# Patient Record
Sex: FEMALE | Race: ASIAN | NOT HISPANIC OR LATINO | Employment: OTHER | URBAN - METROPOLITAN AREA
[De-identification: names, ages, dates, MRNs, and addresses within clinical notes are randomized per-mention and may not be internally consistent; named-entity substitution may affect disease eponyms.]

---

## 2019-04-29 ENCOUNTER — OFFICE VISIT (OUTPATIENT)
Dept: VASCULAR SURGERY | Facility: CLINIC | Age: 64
End: 2019-04-29
Payer: MEDICARE

## 2019-04-29 VITALS
WEIGHT: 104.8 LBS | SYSTOLIC BLOOD PRESSURE: 130 MMHG | TEMPERATURE: 97.7 F | HEIGHT: 61 IN | HEART RATE: 67 BPM | BODY MASS INDEX: 19.79 KG/M2 | DIASTOLIC BLOOD PRESSURE: 90 MMHG

## 2019-04-29 DIAGNOSIS — I83.891 SYMPTOMATIC VARICOSE VEINS OF RIGHT LOWER EXTREMITY: Primary | ICD-10-CM

## 2019-04-29 PROBLEM — I83.899 SYMPTOMATIC VARICOSE VEINS: Status: ACTIVE | Noted: 2019-04-29

## 2019-04-29 PROCEDURE — 99202 OFFICE O/P NEW SF 15 MIN: CPT | Performed by: SURGERY

## 2019-04-29 RX ORDER — CHLORAL HYDRATE 500 MG
2 CAPSULE ORAL DAILY
COMMUNITY

## 2019-04-29 RX ORDER — ESTRADIOL 0.1 MG/G
CREAM VAGINAL
COMMUNITY
Start: 2011-03-07

## 2021-05-19 ENCOUNTER — APPOINTMENT (OUTPATIENT)
Dept: LAB | Facility: HOSPITAL | Age: 66
End: 2021-05-19
Attending: SPECIALIST
Payer: MEDICARE

## 2021-05-19 DIAGNOSIS — G50.1 FACIAL PAIN, ATYPICAL: ICD-10-CM

## 2021-05-19 LAB
BUN SERPL-MCNC: 14 MG/DL (ref 5–25)
CREAT SERPL-MCNC: 0.63 MG/DL (ref 0.6–1.3)
GFR SERPL CREATININE-BSD FRML MDRD: 93 ML/MIN/1.73SQ M

## 2021-05-19 PROCEDURE — 82565 ASSAY OF CREATININE: CPT

## 2021-05-19 PROCEDURE — 36415 COLL VENOUS BLD VENIPUNCTURE: CPT

## 2021-05-19 PROCEDURE — 84520 ASSAY OF UREA NITROGEN: CPT

## 2021-06-01 ENCOUNTER — HOSPITAL ENCOUNTER (OUTPATIENT)
Dept: RADIOLOGY | Facility: HOSPITAL | Age: 66
Discharge: HOME | End: 2021-06-01
Attending: SPECIALIST
Payer: MEDICARE

## 2021-06-01 DIAGNOSIS — G50.1 FACIAL PAIN, ATYPICAL: ICD-10-CM

## 2021-06-01 PROCEDURE — G1004 CDSM NDSC: HCPCS

## 2021-06-01 PROCEDURE — 70491 CT SOFT TISSUE NECK W/DYE: CPT

## 2021-06-01 RX ADMIN — IOHEXOL 70 ML: 350 INJECTION, SOLUTION INTRAVENOUS at 13:31

## 2021-09-25 ENCOUNTER — HOSPITAL ENCOUNTER (OUTPATIENT)
Dept: CT IMAGING | Facility: HOSPITAL | Age: 66
Discharge: HOME | End: 2021-09-25
Attending: SPECIALIST
Payer: MEDICARE

## 2021-09-25 DIAGNOSIS — J32.4 CHRONIC PANSINUSITIS: ICD-10-CM

## 2021-09-25 PROCEDURE — G1004 CDSM NDSC: HCPCS

## 2021-09-25 PROCEDURE — 70486 CT MAXILLOFACIAL W/O DYE: CPT

## 2022-01-10 ENCOUNTER — EVALUATION (OUTPATIENT)
Dept: PHYSICAL THERAPY | Facility: REHABILITATION | Age: 67
End: 2022-01-10
Payer: MEDICARE

## 2022-01-10 DIAGNOSIS — M79.18 MYOFASCIAL PAIN: ICD-10-CM

## 2022-01-10 DIAGNOSIS — M54.50 CHRONIC BILATERAL LOW BACK PAIN WITHOUT SCIATICA: Primary | ICD-10-CM

## 2022-01-10 DIAGNOSIS — G50.1 ATYPICAL FACIAL PAIN: ICD-10-CM

## 2022-01-10 DIAGNOSIS — M54.2 NECK PAIN: ICD-10-CM

## 2022-01-10 DIAGNOSIS — G89.29 CHRONIC BILATERAL LOW BACK PAIN WITHOUT SCIATICA: Primary | ICD-10-CM

## 2022-01-10 PROCEDURE — 97112 NEUROMUSCULAR REEDUCATION: CPT | Performed by: PHYSICAL THERAPIST

## 2022-01-10 PROCEDURE — 97162 PT EVAL MOD COMPLEX 30 MIN: CPT | Performed by: PHYSICAL THERAPIST

## 2022-01-10 NOTE — PROGRESS NOTES
PT Evaluation     Today's date: 1/10/2022  Patient name: Westley Ruiz  : 1955  MRN: [id number]  Referring provider: Radha Kong MD  Dx:   Encounter Diagnosis     ICD-10-CM    1  Chronic bilateral low back pain without sciatica  M54 50     G89 29    2  Neck pain  M54 2    3  Myofascial pain  M79 18    4  Atypical facial pain  G50 1        Start Time: 1205  Stop Time: 1255  Total time in clinic (min): 50 minutes    Assessment  Assessment details: Patient is a 74 y o  female that presents with lumbar spine and cervical/facial pain  Patient presents with decreased strength, decreased ROM, and postural abnormalitities  Patient has difficulty with looking upward, lifting, 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, and with ambulation secondary to impairments  Patient would benefit from skilled physical therapy services to address impairments to maximize function  Impairments: abnormal or restricted ROM, activity intolerance, impaired balance, impaired physical strength, lacks appropriate home exercise program and pain with function  Understanding of Dx/Px/POC: good   Prognosis: fair  Prognosis details: Chronic pain    Goals  Impairment:  1  Patient will reports 50% reduction in pain in 4 weeks to maximize function  2   Patient will improve strength to 4/5 in all planes to maximize function  3   Patient will improve ROM to [facility] in 4 weeks to maximize function  Functional:  1  Patient will improve FOTO by 4 points to 67/100 in 4 weeks to maximize function for lumbar spine  2  Patient will be independent with HEP in 4 weeks to maximize function  3  Patient will report no difficulty with ambulation in 4 weeks to maximize function  4  Patient will report no difficulty with looking upward in 4 weeks to maximize function  5  Patient will report no difficulty with lifting in 4 weeks to maximize function    6  Patient will improve FOTO by 3 points to 63/100 in 4 weeks to maximize function for cervical spine       Plan  Plan details: Patient will be a RE in 4 weeks  Patient would benefit from: skilled physical therapy  Planned modality interventions: cryotherapy, thermotherapy: hydrocollator packs, unattended electrical stimulation and TENS  Planned therapy interventions: abdominal trunk stabilization, patient education, neuromuscular re-education, home exercise program, therapeutic exercise, therapeutic activities, stretching, strengthening, postural training, manual therapy, functional ROM exercises and balance  Frequency: 2x week  Duration in visits: 9  Duration in weeks: 4  Treatment plan discussed with: patient        Subjective Evaluation    History of Present Illness  Mechanism of injury: Patient presents with low back and cervical/facial pain  Patient reports her low back pain has been present for several years, but has been worse over the last year  Her pain is worst in the morning waking up for the first 30-60 minutes  She feels she is flexed forward and would have pain trying to stand up straight  Once she is able to stand up straight her pain is minimal the remainder of the day  She feels she is tilted to the left in the morning  Historically, she feels this is worst when she is inactive and that weak muscles contribute  Patient reports her neck pain has been present for 20-30 years, but she has been having facial pain for the last 2 years  Patient has been taking Nortriptyline for her facial pain with relief  She reports her facial pain can be random  She feels it is nerve related  Patient has no pain with chewing  She will get pain when her head is being pushed forward as if by a couch  Patient reports her cervical spine pain is constant, but is worst with walking into her neck and shoulders as if someone is pulling  She will move her arms overhead and that seems to help  She feels something may be "stuck"    Patient has difficulty with looking upward, lifting, 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, and with ambulation  Pain  At best pain ratin (cervical spine: 1/10)  At worst pain ratin (cervical spine: 9/10 (pulling, tight))  Location: lumbar spine  Quality: tight and pulling  Aggravating factors: walking  Progression: no change    Hand dominance: right      Diagnostic Tests  No diagnostic tests performed  Treatments  Current treatment: physical therapy  Patient Goals  Patient goals for therapy: decreased pain          Objective     Static Posture     Head  Forward  Shoulders  Rounded  Palpation   Left   No palpable tenderness to the levator scapulae, middle trapezius, rhomboids and suboccipitals  Tenderness of the upper trapezius  Trigger point to upper trapezius  Right   No palpable tenderness to the levator scapulae, middle trapezius, rhomboids, suboccipitals and upper trapezius  Tenderness   Cervical Spine   No tenderness in the spinous process  Left Shoulder   No tenderness     Right Shoulder  No tenderness     Lumbar Spine  No tenderness in the spinous process  Left Hip   No tenderness in the PSIS  Right Hip   No tenderness in the PSIS       Neurological Testing     Reflexes   Left   Patellar (L4): brisk (3+)  Achilles (S1): normal (2+)    Right   Patellar (L4): brisk (3+)  Achilles (S1): normal (2+)    Active Range of Motion   Cervical/Thoracic Spine       Cervical    Flexion: 45 degrees   Extension: 65 degrees     with pain  Left lateral flexion: 45 degrees      Right lateral flexion: 35 degrees      Left rotation: 42 degrees  Right rotation: 42 degrees         Left Shoulder   Flexion: WFL  Abduction: WFL    Right Shoulder   Flexion: WFL  Abduction: WFL    Lumbar   Flexion:  WFL  Extension:  Restriction level: minimal  Left lateral flexion:  WFL  Right lateral flexion:  WFL  Left rotation:  Restriction level: minimal  Right rotation:  Restriction level: minimal    Joint Play   Joints within functional limits: C1, C2, C3, C4, C5, C6, C7, T1, T2, L1, L2, L3, L4 and L5     Hypomobile: T3, T4, T5, T6, T7 and T8     Strength/Myotome Testing     Left Shoulder     Planes of Motion   Flexion: 4   Abduction: 4     Right Shoulder     Planes of Motion   Flexion: 4   Abduction: 4     Left Elbow   Flexion: 4  Extension: 4    Right Elbow   Flexion: 4+  Extension: 4+    Left Wrist/Hand      (2nd hand position)     Trial 1: 39    Trial 2: 38    Right Wrist/Hand      (2nd hand position)     Trial 1: 40    Trial 2: 38    Left Hip   Planes of Motion   Flexion: 3+  Extension: 4  Abduction: 4-  External rotation: 4-  Internal rotation: 4    Right Hip   Planes of Motion   Flexion: 3+  Extension: 4  Abduction: 4-  External rotation: 4-  Internal rotation: 4    Left Knee   Flexion: 3+  Extension: 3+    Right Knee   Flexion: 4-  Extension: 3+    Left Ankle/Foot   Dorsiflexion: 4-  Inversion: 4  Eversion: 4    Right Ankle/Foot   Dorsiflexion: 4+  Inversion: 4+  Eversion: 4+    Tests     Lumbar     Left   Negative crossed SLR and passive SLR  Right   Negative crossed SLR and passive SLR  Left Pelvic Girdle/Sacrum   Negative: thigh thrust      Right Pelvic Girdle/Sacrum   Negative: thigh thrust      Left Hip   Negative SOLIS and FADIR  Right Hip   Negative FADIR  Additional Tests Details  (+) Shania sign    Ambulation     Observational Gait   Decreased walking speed and stride length       Functional Assessment        Single Leg Stance   Left: 30 seconds  Right: 26 seconds             Precautions: hx anxiety, osteoporosis      Manuals 1/10            IASTM L UT                                                    Neuro Re-Ed             Abdominal bracing 10" 10x issued            Scapular retraction 10" 10x issued            Cervical retraction 5" 10x issued            Row with band             Deep neck ffexion supine             Prone T, W             SLS             Ther Ex             UBE rev             SLR             sidelying hip abduction             Prone hip extension             bridge                                                    Ther Activity                                       Gait Training                                       Modalities             PRN                            Access Code [email or web address]

## 2022-01-14 ENCOUNTER — OFFICE VISIT (OUTPATIENT)
Dept: PHYSICAL THERAPY | Facility: REHABILITATION | Age: 67
End: 2022-01-14
Payer: MEDICARE

## 2022-01-14 DIAGNOSIS — G89.29 CHRONIC BILATERAL LOW BACK PAIN WITHOUT SCIATICA: Primary | ICD-10-CM

## 2022-01-14 DIAGNOSIS — M54.50 CHRONIC BILATERAL LOW BACK PAIN WITHOUT SCIATICA: Primary | ICD-10-CM

## 2022-01-14 DIAGNOSIS — M79.18 MYOFASCIAL PAIN: ICD-10-CM

## 2022-01-14 DIAGNOSIS — M54.2 NECK PAIN: ICD-10-CM

## 2022-01-14 DIAGNOSIS — G50.1 ATYPICAL FACIAL PAIN: ICD-10-CM

## 2022-01-14 PROCEDURE — 97110 THERAPEUTIC EXERCISES: CPT | Performed by: PHYSICAL THERAPIST

## 2022-01-14 PROCEDURE — 97112 NEUROMUSCULAR REEDUCATION: CPT | Performed by: PHYSICAL THERAPIST

## 2022-01-14 NOTE — PROGRESS NOTES
Daily Note     Today's date: 2022  Patient name: Luis Varela  : 1955  MRN: [id number]  Referring provider: Deepthi Duffy MD  Dx:   Encounter Diagnosis     ICD-10-CM    1  Chronic bilateral low back pain without sciatica  M54 50     G89 29    2  Neck pain  M54 2    3  Myofascial pain  M79 18    4  Atypical facial pain  G50 1        Start Time: 1115  Stop Time: 1200  Total time in clinic (min): 45 minutes    Subjective: Patient presents for first visit post IE  Patient reports that her lumbar spine pain may be slightly improved with HEP, but that her neck pain may be slightly more irritated  Objective: See treatment diary below      Assessment: Tolerated treatment well  Patient demonstrated fatigue post treatment, exhibited good technique with therapeutic exercises and would benefit from continued PT  Patient tolerates all treatment well  Educated she may have soreness from IASTM added this visit  Patient can progress as noted NV  Held on several UE progressions this visit due to soreness from HEP  Plan: Progress treatment as tolerated         Precautions: hx anxiety, osteoporosis      Manuals 1/10 1/14           IASTM L UT  8 min                                                  Neuro Re-Ed             Abdominal bracing 10" 10x issued 10" 10x           Scapular retraction 10" 10x issued 10" 10x           Cervical retraction 5" 10x issued 5" 10x           Row with band  NV           Deep neck ffexion supine  NV           Prone T, W  NV           SLS  3x15" naomi           Ther Ex             UBE for/rev  4 min           SLR  5" 10x naomi           sidelying hip abduction  5" 10x naomi           Prone hip extension  5" 10x naomi           bridge  5" 10x           L UT stretch  3x15"                                     Ther Activity                                       Gait Training                                       Modalities             PRN                          Access BIB [email or web address]

## 2022-01-18 ENCOUNTER — OFFICE VISIT (OUTPATIENT)
Dept: PHYSICAL THERAPY | Facility: REHABILITATION | Age: 67
End: 2022-01-18
Payer: MEDICARE

## 2022-01-18 DIAGNOSIS — M54.2 NECK PAIN: ICD-10-CM

## 2022-01-18 DIAGNOSIS — G89.29 CHRONIC BILATERAL LOW BACK PAIN WITHOUT SCIATICA: Primary | ICD-10-CM

## 2022-01-18 DIAGNOSIS — M54.50 CHRONIC BILATERAL LOW BACK PAIN WITHOUT SCIATICA: Primary | ICD-10-CM

## 2022-01-18 DIAGNOSIS — G50.1 ATYPICAL FACIAL PAIN: ICD-10-CM

## 2022-01-18 DIAGNOSIS — M79.18 MYOFASCIAL PAIN: ICD-10-CM

## 2022-01-18 PROCEDURE — 97110 THERAPEUTIC EXERCISES: CPT

## 2022-01-18 PROCEDURE — 97112 NEUROMUSCULAR REEDUCATION: CPT

## 2022-01-18 NOTE — PROGRESS NOTES
Daily Note     Today's date: 2022  Patient name: Simin Mena  : 1955  MRN: [id number]  Referring provider: Geoffrey Lopez MD  Dx:   Encounter Diagnosis     ICD-10-CM    1  Chronic bilateral low back pain without sciatica  M54 50     G89 29    2  Neck pain  M54 2    3  Myofascial pain  M79 18    4  Atypical facial pain  G50 1        Start Time: 1400  Stop Time: 1445  Total time in clinic (min): 45 minutes    Subjective: Patient reports "I always have some sort of pain" and notes cervical/lumbar symptoms are about the same with no change better or worse since last visit  Pt denies any c/o soreness after last treatment  Pt reports compliance with HEP daily  Objective: See treatment diary below  Precautions: hx anxiety, osteoporosis      Manuals 1/10 1/14 1/18          IASTM L UT  8 min 8 min                                                 Neuro Re-Ed             Abdominal bracing 10" 10x issued 10" 10x 10" x10          Scapular retraction 10" 10x issued 10" 10x 10" x10          Cervical retraction 5" 10x issued 5" 10x 5"x10          Row with band  NV Red 5"x10          Deep neck ffexion supine  NV nv          Prone T, W  NV nv          SLS  3x15" naomi 15"x3 ea          Ther Ex             UBE for/rev  4 min 5 min total          SLR  5" 10x naomi 5"x10 ea          sidelying hip abduction  5" 10x naomi 5"x10 ea          Prone hip extension  5" 10x naomi 5"x10 ea          bridge  5" 10x 5"x12          L UT stretch  3x15" 15"x3                                    Ther Activity                                       Gait Training                                       Modalities             PRN                          Access [facility]            Assessment: Tolerated treatment well  Almost immediate onset of redness with IASTM to L UT region, moderate soft tissue restrictions felt through instruments  Trial of row with band with patient demonstrating mild UT compensation requiring cueing to correct  Good TA contraction, but is more challenged maintaining with DLS activities and demo's mild bilat knee valgus in hooklying position, but can correct with cues  Patient demonstrated fatigue post treatment and would benefit from continued PT  Plan: Progress treatment as tolerated

## 2022-01-21 ENCOUNTER — OFFICE VISIT (OUTPATIENT)
Dept: PHYSICAL THERAPY | Facility: REHABILITATION | Age: 67
End: 2022-01-21
Payer: MEDICARE

## 2022-01-21 DIAGNOSIS — G89.29 CHRONIC BILATERAL LOW BACK PAIN WITHOUT SCIATICA: Primary | ICD-10-CM

## 2022-01-21 DIAGNOSIS — M54.50 CHRONIC BILATERAL LOW BACK PAIN WITHOUT SCIATICA: Primary | ICD-10-CM

## 2022-01-21 DIAGNOSIS — M54.2 NECK PAIN: ICD-10-CM

## 2022-01-21 DIAGNOSIS — M79.18 MYOFASCIAL PAIN: ICD-10-CM

## 2022-01-21 DIAGNOSIS — G50.1 ATYPICAL FACIAL PAIN: ICD-10-CM

## 2022-01-21 PROCEDURE — 97110 THERAPEUTIC EXERCISES: CPT | Performed by: PHYSICAL THERAPIST

## 2022-01-21 PROCEDURE — 97140 MANUAL THERAPY 1/> REGIONS: CPT | Performed by: PHYSICAL THERAPIST

## 2022-01-21 PROCEDURE — 97112 NEUROMUSCULAR REEDUCATION: CPT | Performed by: PHYSICAL THERAPIST

## 2022-01-21 NOTE — PROGRESS NOTES
Daily Note     Today's date: 2022  Patient name: Chun Salmon  : 1955  MRN: [id number]  Referring provider: Zonia Sinha MD  Dx:   Encounter Diagnosis     ICD-10-CM    1  Chronic bilateral low back pain without sciatica  M54 50     G89 29    2  Neck pain  M54 2    3  Myofascial pain  M79 18    4  Atypical facial pain  G50 1        Start Time: 1115  Stop Time: 1200  Total time in clinic (min): 45 minutes    Subjective: Patient reports that she had some left sided LE radicular symptoms this morning after waking up  The symptoms are reducing as the day progresses  Patient says this will occur from time to time, but this has not occurred for about a month  She feels she is having more mobility through her lumbar spine  Patient can get headaches from reading about 30 minutes  Objective: See treatment diary below  Precautions: hx anxiety, osteoporosis      Manuals 1/10 1/14 1/18 1/21         IASTM L UT  8 min 8 min 10 min naomi                                                Neuro Re-Ed             Abdominal bracing 10" 10x issued 10" 10x 10" x10 np         Scapular retraction 10" 10x issued 10" 10x 10" x10 10" 10x         Cervical retraction 5" 10x issued 5" 10x 5"x10 np         Row with band  NV Red 5"x10 Red 5" 15x         Deep neck flexion supine  NV nv 5" 10x         Prone T, W  NV nv 5" 5x each         SLS  3x15" naomi 15"x3 ea np         Ther Ex             UBE for/rev  4 min 5 min total 5 min total         SLR  5" 10x naomi 5"x10 ea 5" 5x anomi         sidelying hip abduction  5" 10x naomi 5"x10 ea 5" 5x naomi         Prone hip extension  5" 10x naomi 5"x10 ea 5" 5x naomi         bridge  5" 10x 5"x12 5" 5x         L UT stretch  3x15" 15"x3 3x15" naomi                                   Ther Activity                                       Gait Training                                       Modalities             PRN                          Access Code [email or web address]          Assessment: Tolerated treatment well  Patient demonstrated fatigue post treatment and would benefit from continued PT  Added IASTM to right UT this visit with good tolerance  Also progressed with addition of prone T and W, and deep neck flexion  Patient would benefit from continued strengthening  Progress as able  Plan: Progress treatment as tolerated

## 2022-01-24 ENCOUNTER — OFFICE VISIT (OUTPATIENT)
Dept: PHYSICAL THERAPY | Facility: REHABILITATION | Age: 67
End: 2022-01-24
Payer: MEDICARE

## 2022-01-24 DIAGNOSIS — G50.1 ATYPICAL FACIAL PAIN: ICD-10-CM

## 2022-01-24 DIAGNOSIS — M54.50 CHRONIC BILATERAL LOW BACK PAIN WITHOUT SCIATICA: Primary | ICD-10-CM

## 2022-01-24 DIAGNOSIS — M54.2 NECK PAIN: ICD-10-CM

## 2022-01-24 DIAGNOSIS — M79.18 MYOFASCIAL PAIN: ICD-10-CM

## 2022-01-24 DIAGNOSIS — G89.29 CHRONIC BILATERAL LOW BACK PAIN WITHOUT SCIATICA: Primary | ICD-10-CM

## 2022-01-24 PROCEDURE — 97112 NEUROMUSCULAR REEDUCATION: CPT

## 2022-01-24 PROCEDURE — 97110 THERAPEUTIC EXERCISES: CPT

## 2022-01-24 PROCEDURE — 97140 MANUAL THERAPY 1/> REGIONS: CPT

## 2022-01-24 NOTE — PROGRESS NOTES
Daily Note     Today's date: 2022  Patient name: Nieves Nagel  : 1955  MRN: [id number]  Referring provider: Rema Hammer MD  Dx:   Encounter Diagnosis     ICD-10-CM    1  Chronic bilateral low back pain without sciatica  M54 50     G89 29    2  Neck pain  M54 2    3  Myofascial pain  M79 18    4  Atypical facial pain  G50 1        Start Time: 1115  Stop Time: 1200  Total time in clinic (min): 45 minutes    Subjective: Patient reports having "the best day" initially after last treatment  Pt reports having no pain and felt better overall emotionally, but notes pain returned the following day  Pt presents to PT this AM noting pain in R UT/scapular region describing as "nerve like pain"           Objective: See treatment diary below  Precautions: hx anxiety, osteoporosis      Manuals 1/10 1/14 1/18 1/21 1/24        IASTM L UT  8 min 8 min 10 min naomi 10 min naomi                                               Neuro Re-Ed             Abdominal bracing 10" 10x issued 10" 10x 10" x10 np 10" x10        Scapular retraction 10" 10x issued 10" 10x 10" x10 10" 10x nv        Cervical retraction 5" 10x issued 5" 10x 5"x10 np 5"x10        Row with band  NV Red 5"x10 Red 5" 15x Red 5"x15        Deep neck flexion supine  NV nv 5" 10x 5"x10        Prone T, W  NV nv 5" 5x each 5"x5 ea        SLS  3x15" naomi 15"x3 ea np np        Ther Ex             UBE for/rev  4 min 5 min total 5 min total 5 min total        SLR  5" 10x naomi 5"x10 ea 5" 5x naomi 5"x10 ea        sidelying hip abduction  5" 10x naomi 5"x10 ea 5" 5x naomi 5"x10 ea        Prone hip extension  5" 10x naomi 5"x10 ea 5" 5x naomi 5"x10 ea        bridge  5" 10x 5"x12 5" 5x 5"x10        L UT stretch  3x15" 15"x3 3x15" naomi 15"x3 ea                                  Ther Activity                                       Gait Training                                       Modalities             PRN                          Access Code [email or web address]          Assessment: Tolerated treatment well  Mod soft tissue restrictions felt through instruments during IASTM to bilat UT region  Pt demo's tendency to perform shoulder elevation/UT compensatory movements with prone T&W over pball and initially during resisted rows requiring cueing to correct  Assess response to treatment NV  Patient demonstrated fatigue post treatment and would benefit from continued PT  Plan: Progress treatment as tolerated

## 2022-01-28 ENCOUNTER — OFFICE VISIT (OUTPATIENT)
Dept: PHYSICAL THERAPY | Facility: REHABILITATION | Age: 67
End: 2022-01-28
Payer: MEDICARE

## 2022-01-28 DIAGNOSIS — M54.50 CHRONIC BILATERAL LOW BACK PAIN WITHOUT SCIATICA: Primary | ICD-10-CM

## 2022-01-28 DIAGNOSIS — M54.2 NECK PAIN: ICD-10-CM

## 2022-01-28 DIAGNOSIS — M79.18 MYOFASCIAL PAIN: ICD-10-CM

## 2022-01-28 DIAGNOSIS — G89.29 CHRONIC BILATERAL LOW BACK PAIN WITHOUT SCIATICA: Primary | ICD-10-CM

## 2022-01-28 DIAGNOSIS — G50.1 ATYPICAL FACIAL PAIN: ICD-10-CM

## 2022-01-28 PROCEDURE — 97112 NEUROMUSCULAR REEDUCATION: CPT

## 2022-01-28 PROCEDURE — 97140 MANUAL THERAPY 1/> REGIONS: CPT

## 2022-01-28 PROCEDURE — 97110 THERAPEUTIC EXERCISES: CPT

## 2022-01-28 NOTE — PROGRESS NOTES
Daily Note     Today's date: 2022  Patient name: Tawny Mcgarry  : 1955  MRN: [id number]  Referring provider: Warren June MD  Dx:   Encounter Diagnosis     ICD-10-CM    1  Chronic bilateral low back pain without sciatica  M54 50     G89 29    2  Neck pain  M54 2    3  Myofascial pain  M79 18    4  Atypical facial pain  G50 1        Start Time: 1115  Stop Time: 1200  Total time in clinic (min): 45 minutes    Subjective: Patient reports "I still have pain, but I feel stronger than I did two weeks ago"  Pt reports pain is worse on R side vs L, notes cervical and face pain continues to be present, but states she was able to read for longer period of time yesterday without symptoms            Objective: See treatment diary below  Precautions: hx anxiety, osteoporosis      Manuals 1/10 1/14 1/18 1/21 1/24 1/28       IASTM L UT  8 min 8 min 10 min naomi 10 min naomi 10 min ea                                              Neuro Re-Ed             Abdominal bracing 10" 10x issued 10" 10x 10" x10 np 10" x10 10" x10       Scapular retraction 10" 10x issued 10" 10x 10" x10 10" 10x nv 10" x10       Cervical retraction 5" 10x issued 5" 10x 5"x10 np 5"x10 5"x10       Row with band  NV Red 5"x10 Red 5" 15x Red 5"x15 Red 5"x20       Deep neck flexion supine  NV nv 5" 10x 5"x10 5"x10       Prone T, W  NV nv 5" 5x each 5"x5 ea 5"x7 ea       SLS  3x15" naomi 15"x3 ea np np 15"x3 ea       Ther Ex             UBE for/rev  4 min 5 min total 5 min total 5 min total 5 min total       SLR  5" 10x naomi 5"x10 ea 5" 5x naomi 5"x10 ea 5"x10 ea       sidelying hip abduction  5" 10x naomi 5"x10 ea 5" 5x naomi 5"x10 ea 5"x10 ea       Prone hip extension  5" 10x naomi 5"x10 ea 5" 5x naomi 5"x10 ea 5"x10 ea       bridge  5" 10x 5"x12 5" 5x 5"x10 5"x15       L UT stretch  3x15" 15"x3 3x15" naomi 15"x3 ea 15"x3 ea                                 Ther Activity                                       Gait Training Modalities             PRN                          Access Code [email or web address]          Assessment: Tolerated treatment well  Moderate soft tissue restrictions felt through instruments during IASTM to bilat UT  Pt is more challenged with scapular positioning with resisted row this visit requiring verbal and tactile cueing to perform without posterior lean and fwd shoulder roll, better technique demonstrated without band  Cueing provided for neutral cervical postioning with prone T&W and performs all activities to fatigue  Patient demonstrated fatigue post treatment and would benefit from continued PT  Plan: Progress treatment as tolerated

## 2022-01-31 ENCOUNTER — OFFICE VISIT (OUTPATIENT)
Dept: PHYSICAL THERAPY | Facility: REHABILITATION | Age: 67
End: 2022-01-31
Payer: MEDICARE

## 2022-01-31 DIAGNOSIS — M54.2 NECK PAIN: ICD-10-CM

## 2022-01-31 DIAGNOSIS — G50.1 ATYPICAL FACIAL PAIN: ICD-10-CM

## 2022-01-31 DIAGNOSIS — M79.18 MYOFASCIAL PAIN: ICD-10-CM

## 2022-01-31 DIAGNOSIS — G89.29 CHRONIC BILATERAL LOW BACK PAIN WITHOUT SCIATICA: Primary | ICD-10-CM

## 2022-01-31 DIAGNOSIS — M54.50 CHRONIC BILATERAL LOW BACK PAIN WITHOUT SCIATICA: Primary | ICD-10-CM

## 2022-01-31 PROCEDURE — 97110 THERAPEUTIC EXERCISES: CPT

## 2022-01-31 PROCEDURE — 97112 NEUROMUSCULAR REEDUCATION: CPT

## 2022-01-31 PROCEDURE — 97140 MANUAL THERAPY 1/> REGIONS: CPT

## 2022-01-31 NOTE — PROGRESS NOTES
Daily Note     Today's date: 2022  Patient name: Rodo Abel  : 1955  MRN: [id number]  Referring provider: Svetlana Leos MD  Dx:   Encounter Diagnosis     ICD-10-CM    1  Chronic bilateral low back pain without sciatica  M54 50     G89 29    2  Neck pain  M54 2    3  Myofascial pain  M79 18    4  Atypical facial pain  G50 1        Start Time: 1115  Stop Time: 1200  Total time in clinic (min): 45 minutes    Subjective: Patient reports pain continues to be present into bilat UT/cervical region with no real change in frequency or intensity, but reports "I feel stronger"  Pt reports noticing strength is improving overall and notes lumbar symptoms are improving where she has noticed feeling less stiff and more mobile upon waking in the morning            Objective: See treatment diary below  Precautions: hx anxiety, osteoporosis      Manuals 1/10 1/14 1/18 1/21 1/24 1/28 1/31      IASTM L UT  8 min 8 min 10 min naomi 10 min naomi 10 min ea 10 min naomi                                             Neuro Re-Ed             Abdominal bracing 10" 10x issued 10" 10x 10" x10 np 10" x10 10" x10 10" x10      Scapular retraction 10" 10x issued 10" 10x 10" x10 10" 10x nv 10" x10 10" x10      Cervical retraction 5" 10x issued 5" 10x 5"x10 np 5"x10 5"x10 5"x10      Row with band  NV Red 5"x10 Red 5" 15x Red 5"x15 Red 5"x20 Red 5"x20      Deep neck flexion supine  NV nv 5" 10x 5"x10 5"x10 5"x10      Prone T, W  NV nv 5" 5x each 5"x5 ea 5"x7 ea 5"x7 ea      SLS  3x15" naomi 15"x3 ea np np 15"x3 ea 20"x3 ea      Ther Ex             UBE for/rev  4 min 5 min total 5 min total 5 min total 5 min total 5 min total      SLR  5" 10x naomi 5"x10 ea 5" 5x naomi 5"x10 ea 5"x10 ea np      sidelying hip abduction  5" 10x naomi 5"x10 ea 5" 5x naomi 5"x10 ea 5"x10 ea np      Prone hip extension  5" 10x naomi 5"x10 ea 5" 5x naomi 5"x10 ea 5"x10 ea np      bridge  5" 10x 5"x12 5" 5x 5"x10 5"x15 5"x15      L UT stretch  3x15" 15"x3 3x15" naomi 15"x3 ea 15"x3 ea 15"x3 ea                                Ther Activity                                       Gait Training                                       Modalities             PRN                          Access Code [email or web address]        Assessment: Tolerated treatment well  Pt cont to present with moderate soft tissue restrictions into bilat UT/LS region with IASTM  Pt demonstrates better balance in SLS with improved gluteal activation and only mild swaying on occasion  Mild UT compensation with prone scapular strengthening, corrected with cues  Cueing also provided for cervical positioning throughout treatment to ensure neutral positioning without protracting  Patient demonstrated fatigue post treatment and would benefit from continued PT  Plan: Progress treatment as tolerated  Pt was treated via unsupervised time from 11:55 - 12:00 pm and was 1:1 with treating clinician for rest of session

## 2022-02-04 ENCOUNTER — OFFICE VISIT (OUTPATIENT)
Dept: PHYSICAL THERAPY | Facility: REHABILITATION | Age: 67
End: 2022-02-04
Payer: MEDICARE

## 2022-02-04 DIAGNOSIS — G89.29 CHRONIC BILATERAL LOW BACK PAIN WITHOUT SCIATICA: Primary | ICD-10-CM

## 2022-02-04 DIAGNOSIS — M54.50 CHRONIC BILATERAL LOW BACK PAIN WITHOUT SCIATICA: Primary | ICD-10-CM

## 2022-02-04 DIAGNOSIS — M54.2 NECK PAIN: ICD-10-CM

## 2022-02-04 DIAGNOSIS — M79.18 MYOFASCIAL PAIN: ICD-10-CM

## 2022-02-04 DIAGNOSIS — G50.1 ATYPICAL FACIAL PAIN: ICD-10-CM

## 2022-02-04 PROCEDURE — 97112 NEUROMUSCULAR REEDUCATION: CPT

## 2022-02-04 PROCEDURE — 97110 THERAPEUTIC EXERCISES: CPT

## 2022-02-04 PROCEDURE — 97140 MANUAL THERAPY 1/> REGIONS: CPT

## 2022-02-04 NOTE — PROGRESS NOTES
Daily Note     Today's date: 2022  Patient name: Ignacio Lipcami  : 1955  MRN: [id number]  Referring provider: Alysia Crowley MD  Dx:   Encounter Diagnosis     ICD-10-CM    1  Chronic bilateral low back pain without sciatica  M54 50     G89 29    2  Neck pain  M54 2    3  Myofascial pain  M79 18    4  Atypical facial pain  G50 1        Start Time: 1115  Stop Time: 1155  Total time in clinic (min): 40 minutes    Subjective: Patient reports "I'm getting better"  Pt reports reduced pain levels, improvements in function and strength with daily activities          Objective: See treatment diary below  Precautions: hx anxiety, osteoporosis      Manuals 1/10 1/14 1/18 1/21 1/24 1/28 1/31 2/4     IASTM L UT  8 min 8 min 10 min naomi 10 min naomi 10 min ea 10 min naomi 10 min naomi                                            Neuro Re-Ed             Abdominal bracing 10" 10x issued 10" 10x 10" x10 np 10" x10 10" x10 10" x10 10" x10     Scapular retraction 10" 10x issued 10" 10x 10" x10 10" 10x nv 10" x10 10" x10 10" x10     Cervical retraction 5" 10x issued 5" 10x 5"x10 np 5"x10 5"x10 5"x10 5"x15     Row with band  NV Red 5"x10 Red 5" 15x Red 5"x15 Red 5"x20 Red 5"x20 Green 5"x15     Deep neck flexion supine  NV nv 5" 10x 5"x10 5"x10 5"x10 5"x10     Prone T, W  NV nv 5" 5x each 5"x5 ea 5"x7 ea 5"x7 ea 5"x10 ea     SLS  3x15" naomi 15"x3 ea np np 15"x3 ea 20"x3 ea 20"x3 ea     Ther Ex             UBE for/rev  4 min 5 min total 5 min total 5 min total 5 min total 5 min total 5 min total     SLR  5" 10x naomi 5"x10 ea 5" 5x naomi 5"x10 ea 5"x10 ea np np     sidelying hip abduction  5" 10x naomi 5"x10 ea 5" 5x naomi 5"x10 ea 5"x10 ea np np     Prone hip extension  5" 10x naomi 5"x10 ea 5" 5x naomi 5"x10 ea 5"x10 ea np np     bridge  5" 10x 5"x12 5" 5x 5"x10 5"x15 5"x15 5"x15     L UT stretch  3x15" 15"x3 3x15" naomi 15"x3 ea 15"x3 ea 15"x3 ea 15"x3 ea                               Ther Activity                                       Gait Training                                       Modalities             PRN                          Access Code [email or web address]        Assessment: Tolerated treatment well  Pt presents with greater soft tissue restrictions and tenderness in R UT compared to L this visit  Cueing required to perform UT stretching without cervical rotation  Good tolerance to progression of strength activities as noted without increase in symptoms  Provided cueing to maintain neutral cervical positioning with prone T&W  Patient demonstrated fatigue post treatment and would benefit from continued PT  Plan: Progress treatment as tolerated  Plan to RE next visit

## 2022-02-07 ENCOUNTER — EVALUATION (OUTPATIENT)
Dept: PHYSICAL THERAPY | Facility: REHABILITATION | Age: 67
End: 2022-02-07
Payer: MEDICARE

## 2022-02-07 DIAGNOSIS — M79.18 MYOFASCIAL PAIN: ICD-10-CM

## 2022-02-07 DIAGNOSIS — G50.1 ATYPICAL FACIAL PAIN: ICD-10-CM

## 2022-02-07 DIAGNOSIS — M54.50 CHRONIC BILATERAL LOW BACK PAIN WITHOUT SCIATICA: Primary | ICD-10-CM

## 2022-02-07 DIAGNOSIS — G89.29 CHRONIC BILATERAL LOW BACK PAIN WITHOUT SCIATICA: Primary | ICD-10-CM

## 2022-02-07 DIAGNOSIS — M54.2 NECK PAIN: ICD-10-CM

## 2022-02-07 PROCEDURE — 97140 MANUAL THERAPY 1/> REGIONS: CPT | Performed by: PHYSICAL THERAPIST

## 2022-02-07 NOTE — PROGRESS NOTES
PT Re-Evaluation     Today's date: 2022  Patient name: Andrea Diehl  : 1955  MRN: [id number]  Referring provider: Benjy Reeder MD  Dx:   Encounter Diagnosis     ICD-10-CM    1  Chronic bilateral low back pain without sciatica  M54 50     G89 29    2  Neck pain  M54 2    3  Myofascial pain  M79 18    4  Atypical facial pain  G50 1        Start Time: 1115  Stop Time: 1205  Total time in clinic (min): 50 minutes    Assessment  Assessment details: Patient is a 74 y o  female that presents with lumbar spine and cervical/facial pain  Patient reports improvement with skilled physical therapy services overall with her lumbar spine and cervical spine pain  She has not noticed any overall improvement with facial pain to date  Patient's FOTO improved by 8 points to 71/100 for lumbar spine and 3 points to 63/100 for cervical spine  Patient reports improvement with low back pain and mobility in the morning  She feels like she can now twist a little bit with waking up in the morning  She feels she is improving overall with her strength  She can now read for a longer duration and walk without shoulder/cervical spine pain  Patient reports continued difficulty with facial pain, headaches, heaviness in her arms, and difficulty moving in the morning  Patient has made good progress towards goals established for physical therapy  Patient would benefit from continued skilled physical therapy services for continued strengthening, postural education, and manual therapy to maximize function  Impairments: abnormal or restricted ROM, activity intolerance, impaired balance, impaired physical strength, lacks appropriate home exercise program and pain with function  Understanding of Dx/Px/POC: good   Prognosis: fair  Prognosis details: Chronic pain    Goals  Impairment:  1  Patient will reports 50% reduction in pain in 4 weeks to maximize function  -PARTIALLY MET  2    Patient will improve strength to 4/5 in all planes to maximize function  -PARTIALLY MET  3  Patient will improve ROM to [facility] in 4 weeks to maximize function  -PARTIALLY MET    Functional:  1  Patient will improve FOTO by 4 points to 67/100 in 4 weeks to maximize function for lumbar spine  -MET  2  Patient will be independent with HEP in 4 weeks to maximize function  -MET  3  Patient will report no difficulty with ambulation in 4 weeks to maximize function  -PARTIALLY MET  4  Patient will report no difficulty with looking upward in 4 weeks to maximize function  -NOT MET  5  Patient will report no difficulty with lifting in 4 weeks to maximize function  -NOT ATTEMPTED  6  Patient will improve FOTO by 3 points to 63/100 in 4 weeks to maximize function for cervical spine  -MET      Plan  Plan details: Patient will be a RE in 4 weeks  Patient would benefit from: skilled physical therapy  Planned modality interventions: cryotherapy, thermotherapy: hydrocollator packs, unattended electrical stimulation and TENS  Planned therapy interventions: abdominal trunk stabilization, patient education, neuromuscular re-education, home exercise program, therapeutic exercise, therapeutic activities, stretching, strengthening, postural training, manual therapy, functional ROM exercises and balance  Frequency: 2x week  Duration in visits: 8  Duration in weeks: 4  Treatment plan discussed with: patient        Subjective Evaluation    History of Present Illness  Mechanism of injury: Patient presents with low back and cervical/facial pain  Patient reports her low back pain has been present for several years, but has been worse over the last year  Her pain is worst in the morning waking up for the first 30-60 minutes  She feels she is flexed forward and would have pain trying to stand up straight  Once she is able to stand up straight her pain is minimal the remainder of the day  She feels she is tilted to the left in the morning    Historically, she feels this is worst when she is inactive and that weak muscles contribute  Patient reports her neck pain has been present for 20-30 years, but she has been having facial pain for the last 2 years  Patient has been taking Nortriptyline for her facial pain with relief  She reports her facial pain can be random  She feels it is nerve related  Patient has no pain with chewing  She will get pain when her head is being pushed forward as if by a couch  Patient reports her cervical spine pain is constant, but is worst with walking into her neck and shoulders as if someone is pulling  She will move her arms overhead and that seems to help  She feels something may be "stuck"  Patient has difficulty with looking upward, lifting, 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, and with ambulation  Pain  At best pain ratin (cervical spine: 1/10)  At worst pain ratin (cervical spine: 9/10 (tight))  Location: lumbar spine  Quality: tight and pulling  Aggravating factors: walking  Progression: improved    Hand dominance: right      Diagnostic Tests  No diagnostic tests performed  Treatments  Current treatment: physical therapy  Patient Goals  Patient goals for therapy: decreased pain          Objective     Static Posture     Head  Forward  Shoulders  Rounded  Palpation   Left   No palpable tenderness to the levator scapulae, lumbar paraspinals, middle trapezius, rhomboids and suboccipitals  Tenderness of the upper trapezius  Right   No palpable tenderness to the levator scapulae, lumbar paraspinals, middle trapezius, rhomboids, suboccipitals and upper trapezius  Tenderness   Cervical Spine   No tenderness in the spinous process  Left Shoulder   No tenderness     Right Shoulder  No tenderness     Lumbar Spine  No tenderness in the spinous process  Left Hip   No tenderness in the PSIS  Right Hip   No tenderness in the PSIS       Neurological Testing     Reflexes   Left   Patellar (L4): brisk (3+)  Achilles (S1): normal (2+)    Right Patellar (L4): brisk (3+)  Achilles (S1): normal (2+)    Active Range of Motion   Cervical/Thoracic Spine       Cervical    Flexion: 50 degrees  with pain  Extension: 60 degrees     with pain  Left lateral flexion: 32 degrees      Right lateral flexion: 35 degrees      Left rotation: 42 degrees  Right rotation: 40 degrees    with pain  Left Shoulder   Flexion: WFL  Abduction: WFL    Right Shoulder   Flexion: WFL  Abduction: WFL    Lumbar   Flexion:  WFL  Extension:  WFL and with pain  Left lateral flexion:  WFL  Right lateral flexion:  WFL  Left rotation:  WFL  Right rotation:  WFL    Passive Range of Motion   Cervical/Thoracic Spine   Normal passive range of motion    Joint Play   Joints within functional limits: C1, C2, C3, C4, C5, C6, C7, T1, T2, L1, L2, L3, L4 and L5     Hypomobile: T3, T4, T5, T6, T7 and T8     Strength/Myotome Testing     Left Shoulder     Planes of Motion   Flexion: 4   Abduction: 4     Right Shoulder     Planes of Motion   Flexion: 4   Abduction: 4     Left Elbow   Flexion: 4  Extension: 4    Right Elbow   Flexion: 4+  Extension: 4+    Left Wrist/Hand      (2nd hand position)     Trial 1: 38    Right Wrist/Hand      (2nd hand position)     Trial 1: 40    Left Hip   Planes of Motion   Flexion: 4  Extension: 4  Abduction: 4  External rotation: 4-  Internal rotation: 4-    Right Hip   Planes of Motion   Flexion: 4  Extension: 4-  Abduction: 4  External rotation: 4  Internal rotation: 4    Left Knee   Flexion: 4-  Extension: 4-    Right Knee   Flexion: 4-  Extension: 4-    Left Ankle/Foot   Dorsiflexion: 4  Inversion: 4+  Eversion: 4    Right Ankle/Foot   Dorsiflexion: 4+  Inversion: 4  Eversion: 4+    Tests     Lumbar     Left   Negative crossed SLR and passive SLR  Right   Negative crossed SLR and passive SLR  Left Pelvic Girdle/Sacrum   Negative: thigh thrust      Right Pelvic Girdle/Sacrum   Negative: thigh thrust      Left Hip   Negative SOLIS and VAMSHIIR       Right Hip Negative FADIR  Additional Tests Details  (-) [place] sign    Ambulation     Observational Gait   Decreased walking speed and stride length       Functional Assessment        Single Leg Stance   Left: 30 seconds  Right: 30 seconds             Precautions: hx anxiety, osteoporosis        Manuals 1/10 1/14 1/18 1/21 1/24 1/28 1/31 2/4  2/7     IASTM L UT   8 min 8 min 10 min naomi 10 min naomi 10 min ea 10 min naomi 10 min naomi       measurements/RE                  35 min      manual traction                  5 min      p-a thoracic spine                  NV     Neuro Re-Ed                       Abdominal bracing 10" 10x issued 10" 10x 10" x10 np 10" x10 10" x10 10" x10 10" x10       Scapular retraction 10" 10x issued 10" 10x 10" x10 10" 10x nv 10" x10 10" x10 10" x10       Cervical retraction 5" 10x issued 5" 10x 5"x10 np 5"x10 5"x10 5"x10 5"x15       Row with band   NV Red 5"x10 Red 5" 15x Red 5"x15 Red 5"x20 Red 5"x20 Green 5"x15       Deep neck flexion supine   NV nv 5" 10x 5"x10 5"x10 5"x10 5"x10       Prone T, W   NV nv 5" 5x each 5"x5 ea 5"x7 ea 5"x7 ea 5"x10 ea       SLS   3x15" naomi 15"x3 ea np np 15"x3 ea 20"x3 ea 20"x3 ea       Ther Ex                       UBE for/rev   4 min 5 min total 5 min total 5 min total 5 min total 5 min total 5 min total  5 min total     SLR   5" 10x naomi 5"x10 ea 5" 5x naomi 5"x10 ea 5"x10 ea np np       sidelying hip abduction   5" 10x naomi 5"x10 ea 5" 5x naomi 5"x10 ea 5"x10 ea np np       Prone hip extension   5" 10x naomi 5"x10 ea 5" 5x naomi 5"x10 ea 5"x10 ea np np       bridge   5" 10x 5"x12 5" 5x 5"x10 5"x15 5"x15 5"x15       L UT stretch   3x15" 15"x3 3x15" naomi 15"x3 ea 15"x3 ea 15"x3 ea 15"x3 ea                                                       Ther Activity                                                                       Gait Training                                                                       Modalities                       PRN                                               Access Code [email or web address]

## 2022-02-11 ENCOUNTER — OFFICE VISIT (OUTPATIENT)
Dept: PHYSICAL THERAPY | Facility: REHABILITATION | Age: 67
End: 2022-02-11
Payer: MEDICARE

## 2022-02-11 DIAGNOSIS — M79.18 MYOFASCIAL PAIN: ICD-10-CM

## 2022-02-11 DIAGNOSIS — M54.50 CHRONIC BILATERAL LOW BACK PAIN WITHOUT SCIATICA: Primary | ICD-10-CM

## 2022-02-11 DIAGNOSIS — M54.2 NECK PAIN: ICD-10-CM

## 2022-02-11 DIAGNOSIS — G50.1 ATYPICAL FACIAL PAIN: ICD-10-CM

## 2022-02-11 DIAGNOSIS — G89.29 CHRONIC BILATERAL LOW BACK PAIN WITHOUT SCIATICA: Primary | ICD-10-CM

## 2022-02-11 PROCEDURE — 97112 NEUROMUSCULAR REEDUCATION: CPT | Performed by: PHYSICAL THERAPIST

## 2022-02-11 PROCEDURE — 97140 MANUAL THERAPY 1/> REGIONS: CPT | Performed by: PHYSICAL THERAPIST

## 2022-02-11 PROCEDURE — 97110 THERAPEUTIC EXERCISES: CPT | Performed by: PHYSICAL THERAPIST

## 2022-02-11 NOTE — PROGRESS NOTES
Daily Note     Today's date: 2022  Patient name: Jackson Soriano  : 1955  MRN: [id number]  Referring provider: Homero Lazar MD  Dx:   Encounter Diagnosis     ICD-10-CM    1  Chronic bilateral low back pain without sciatica  M54 50     G89 29    2  Neck pain  M54 2    3  Myofascial pain  M79 18    4  Atypical facial pain  G50 1                   Subjective: Patient reports increased cervical and lumbar spine discomfort this morning  She had soreness from her RE last visit and was on the floor working for about 30 minutes yesterday  She also walked a lot yesterday  She is having some difficulty with her HEP for prone T and W with breathing  She feels the deep neck flexion may be contributing to a headache  She has been having some discomfort in the left anterior neck around the submandibular gland  Objective: See treatment diary below      Assessment: Tolerated treatment fair  Patient exhibited good technique with therapeutic exercises and would benefit from continued PT  Patient is progressed with addition of p-a thoracic spine this visit  She has some increased difficulty breathing with the pressure  Patient educated to continue current HEP, but that she can break exercises up into multiple sets to complete as needed  Continue to progress as able  Plan: Progress treatment as tolerated         Precautions: hx anxiety, osteoporosis        Manuals 1/10 1/14 1/18 1/21 1/24 1/28 1/31 2/4  2/7 2/11   IASTM L UT   8 min 8 min 10 min naomi 10 min naomi 10 min ea 10 min naomi 10 min naomi    10 min   measurements/RE                  35 min      manual traction                  5 min  NV    p-a thoracic spine                  NV  5 min   Neuro Re-Ed                       Abdominal bracing 10" 10x issued 10" 10x 10" x10 np 10" x10 10" x10 10" x10 10" x10       Scapular retraction 10" 10x issued 10" 10x 10" x10 10" 10x nv 10" x10 10" x10 10" x10       Cervical retraction 5" 10x issued 5" 10x 5"x10 np 5"x10 5"x10 5"x10 5"x15       Row with band   NV Red 5"x10 Red 5" 15x Red 5"x15 Red 5"x20 Red 5"x20 Green 5"x15    green 5" 15x   Deep neck flexion supine   NV nv 5" 10x 5"x10 5"x10 5"x10 5"x10       Prone T, W   NV nv 5" 5x each 5"x5 ea 5"x7 ea 5"x7 ea 5"x10 ea    5" 10x T   SLS   3x15" naomi 15"x3 ea np np 15"x3 ea 20"x3 ea 20"x3 ea       Ther Ex                       UBE for/rev   4 min 5 min total 5 min total 5 min total 5 min total 5 min total 5 min total  5 min total  5 min total   SLR   5" 10x naomi 5"x10 ea 5" 5x naomi 5"x10 ea 5"x10 ea np np       sidelying hip abduction   5" 10x naomi 5"x10 ea 5" 5x naomi 5"x10 ea 5"x10 ea np np       Prone hip extension   5" 10x naomi 5"x10 ea 5" 5x naomi 5"x10 ea 5"x10 ea np np       bridge   5" 10x 5"x12 5" 5x 5"x10 5"x15 5"x15 5"x15       L UT stretch   3x15" 15"x3 3x15" naomi 15"x3 ea 15"x3 ea 15"x3 ea 15"x3 ea    15" 3x    patient edu                    10 min                           Ther Activity                                                                       Gait Training                                                                       Modalities                       PRN                                               Access Code [email or web address]

## 2022-02-14 ENCOUNTER — OFFICE VISIT (OUTPATIENT)
Dept: PHYSICAL THERAPY | Facility: REHABILITATION | Age: 67
End: 2022-02-14
Payer: MEDICARE

## 2022-02-14 DIAGNOSIS — G89.29 CHRONIC BILATERAL LOW BACK PAIN WITHOUT SCIATICA: Primary | ICD-10-CM

## 2022-02-14 DIAGNOSIS — M79.18 MYOFASCIAL PAIN: ICD-10-CM

## 2022-02-14 DIAGNOSIS — M54.50 CHRONIC BILATERAL LOW BACK PAIN WITHOUT SCIATICA: Primary | ICD-10-CM

## 2022-02-14 DIAGNOSIS — M54.2 NECK PAIN: ICD-10-CM

## 2022-02-14 PROCEDURE — 97140 MANUAL THERAPY 1/> REGIONS: CPT

## 2022-02-14 PROCEDURE — 97112 NEUROMUSCULAR REEDUCATION: CPT

## 2022-02-14 PROCEDURE — 97110 THERAPEUTIC EXERCISES: CPT

## 2022-02-14 NOTE — PROGRESS NOTES
Daily Note     Today's date: 2022  Patient name: Damaris Sanderson  : 1955  MRN: [id number]  Referring provider: Albino Boateng MD  Dx:   Encounter Diagnosis     ICD-10-CM    1  Chronic bilateral low back pain without sciatica  M54 50     G89 29    2  Neck pain  M54 2    3  Myofascial pain  M79 18        Start Time: 1145  Stop Time: 1230  Total time in clinic (min): 45 minutes    Subjective: Patient reports significant improvement overall after addition of mobilizations last visit  Pt reports having less pain, less stiffness and felt both prone scapular strengthening and deep neck flexion exercises went easier at home  Pt reports "I always have pain" throughout the back and neck region but feels things are improving        Objective: See treatment diary below    Precautions: hx anxiety, osteoporosis        Manuals    IASTM L UT 10 min 8 min 8 min 10 min naomi 10 min naomi 10 min ea 10 min naomi 10 min naomi    10 min   measurements/RE                  35 min      manual traction 5 min                5 min  NV    p-a thoracic spine 5 min                NV  5 min   Neuro Re-Ed                       Abdominal bracing  10" 10x 10" x10 np 10" x10 10" x10 10" x10 10" x10       Scapular retraction  10" 10x 10" x10 10" 10x nv 10" x10 10" x10 10" x10       Cervical retraction  5" 10x 5"x10 np 5"x10 5"x10 5"x10 5"x15       Row with band Green 5"x15  NV Red 5"x10 Red 5" 15x Red 5"x15 Red 5"x20 Red 5"x20 Green 5"x15    green 5" 15x   Deep neck flexion supine 5"x10 NV nv 5" 10x 5"x10 5"x10 5"x10 5"x10       Prone T, W 5"x10 T NV nv 5" 5x each 5"x5 ea 5"x7 ea 5"x7 ea 5"x10 ea    5" 10x T   SLS 20"x3 ea 3x15" naomi 15"x3 ea np np 15"x3 ea 20"x3 ea 20"x3 ea       Ther Ex                       UBE for/rev 5 min total 4 min 5 min total 5 min total 5 min total 5 min total 5 min total 5 min total  5 min total  5 min total   SLR   5" 10x naomi 5"x10 ea 5" 5x naomi 5"x10 ea 5"x10 ea np np       sidelying hip abduction   5" 10x naomi 5"x10 ea 5" 5x naomi 5"x10 ea 5"x10 ea np np       Prone hip extension   5" 10x naomi 5"x10 ea 5" 5x naomi 5"x10 ea 5"x10 ea np np       bridge   5" 10x 5"x12 5" 5x 5"x10 5"x15 5"x15 5"x15       L UT stretch 15"x3 ea 3x15" 15"x3 3x15" naomi 15"x3 ea 15"x3 ea 15"x3 ea 15"x3 ea    15" 3x    patient edu                    10 min                           Ther Activity                                                                       Gait Training                                                                       Modalities                       PRN                                               Access Code [email or web address]        Assessment: Tolerated treatment well  Good tolerance and positive response to mobilizations performed by Che Landin DPT  Mod soft tissue restrictions felt through instruments during IASTM to bilat UT  Pt is able to perform all TE without increase in sx's or complaints  Patient exhibited good technique with therapeutic exercises and would benefit from continued PT  Plan: Progress treatment as tolerated

## 2022-02-18 ENCOUNTER — OFFICE VISIT (OUTPATIENT)
Dept: PHYSICAL THERAPY | Facility: REHABILITATION | Age: 67
End: 2022-02-18
Payer: MEDICARE

## 2022-02-18 DIAGNOSIS — G89.29 CHRONIC BILATERAL LOW BACK PAIN WITHOUT SCIATICA: Primary | ICD-10-CM

## 2022-02-18 DIAGNOSIS — M54.2 NECK PAIN: ICD-10-CM

## 2022-02-18 DIAGNOSIS — M79.18 MYOFASCIAL PAIN: ICD-10-CM

## 2022-02-18 DIAGNOSIS — M54.50 CHRONIC BILATERAL LOW BACK PAIN WITHOUT SCIATICA: Primary | ICD-10-CM

## 2022-02-18 DIAGNOSIS — G50.1 ATYPICAL FACIAL PAIN: ICD-10-CM

## 2022-02-18 PROCEDURE — 97140 MANUAL THERAPY 1/> REGIONS: CPT

## 2022-02-18 PROCEDURE — 97110 THERAPEUTIC EXERCISES: CPT

## 2022-02-18 PROCEDURE — 97112 NEUROMUSCULAR REEDUCATION: CPT

## 2022-02-18 NOTE — PROGRESS NOTES
Daily Note     Today's date: 2022  Patient name: Cande Lopez  : 1955  MRN: [id number]  Referring provider: Shayla Tolbert MD  Dx:   Encounter Diagnosis     ICD-10-CM    1  Chronic bilateral low back pain without sciatica  M54 50     G89 29    2  Neck pain  M54 2    3  Myofascial pain  M79 18    4  Atypical facial pain  G50 1        Start Time: 1110  Stop Time: 1155  Total time in clinic (min): 45 minutes    Subjective: Patient reports "I'm doing much better"  Pt reports favoring fwd flexed positioning for comfort but recognizes that position isn't good and has been working on posturing at home  Pt reports pain cont to be present in bilat UT and thoracic region, but notes overall a big improvement in pain levels        Objective: See treatment diary below    Precautions: hx anxiety, osteoporosis        Manuals    IASTM L UT 10 min 10 min 8 min 10 min naomi 10 min naomi 10 min ea 10 min naomi 10 min naomi    10 min   measurements/RE                  35 min      manual traction 5 min 5 min              5 min  NV    p-a thoracic spine 5 min 5 min              NV  5 min   Neuro Re-Ed                       Abdominal bracing   10" x10 np 10" x10 10" x10 10" x10 10" x10       Scapular retraction   10" x10 10" 10x nv 10" x10 10" x10 10" x10       Cervical retraction   5"x10 np 5"x10 5"x10 5"x10 5"x15       Row with band Green 5"x15  Green 5"x20 Red 5"x10 Red 5" 15x Red 5"x15 Red 5"x20 Red 5"x20 Green 5"x15    green 5" 15x   Deep neck flexion supine 5"x10 5"x10 nv 5" 10x 5"x10 5"x10 5"x10 5"x10       Prone T, W 5"x10 T 5"x10 ea nv 5" 5x each 5"x5 ea 5"x7 ea 5"x7 ea 5"x10 ea    5" 10x T   SLS 20"x3 ea 30"x3 ea 15"x3 ea np np 15"x3 ea 20"x3 ea 20"x3 ea       Ther Ex                       UBE for/rev 5 min total 5 min total 5 min total 5 min total 5 min total 5 min total 5 min total 5 min total  5 min total  5 min total   SLR    5"x10 ea 5" 5x naomi 5"x10 ea 5"x10 ea np np       sidelying hip abduction    5"x10 ea 5" 5x naomi 5"x10 ea 5"x10 ea np np       Prone hip extension    5"x10 ea 5" 5x naomi 5"x10 ea 5"x10 ea np np       bridge    5"x12 5" 5x 5"x10 5"x15 5"x15 5"x15       L UT stretch 15"x3 ea 15"x3 ea 15"x3 3x15" naomi 15"x3 ea 15"x3 ea 15"x3 ea 15"x3 ea    15" 3x    patient edu                   10 min                           Ther Activity                                                                       Gait Training                                                                       Modalities                       PRN                                               Access Code [email or web address]        Assessment: Tolerated treatment well  Mod soft tissue restrictions and almost immediate onset of redness in bilat UT region during IASTM  Pt notes improved posturing and ability to stand up straight after mobilizations performed by Espinoza York DPT  Mild cueing for positioning with deep neck flex  Tolerated progressions to postural strengthening as noted  Progressed SLS hold time with greater challenge but overall demo's improved balance  Patient exhibited good technique with therapeutic exercises and would benefit from continued PT  Plan: Progress treatment as tolerated

## 2022-02-21 ENCOUNTER — OFFICE VISIT (OUTPATIENT)
Dept: PHYSICAL THERAPY | Facility: REHABILITATION | Age: 67
End: 2022-02-21
Payer: MEDICARE

## 2022-02-21 DIAGNOSIS — M54.50 CHRONIC BILATERAL LOW BACK PAIN WITHOUT SCIATICA: Primary | ICD-10-CM

## 2022-02-21 DIAGNOSIS — M79.18 MYOFASCIAL PAIN: ICD-10-CM

## 2022-02-21 DIAGNOSIS — M54.2 NECK PAIN: ICD-10-CM

## 2022-02-21 DIAGNOSIS — G50.1 ATYPICAL FACIAL PAIN: ICD-10-CM

## 2022-02-21 DIAGNOSIS — G89.29 CHRONIC BILATERAL LOW BACK PAIN WITHOUT SCIATICA: Primary | ICD-10-CM

## 2022-02-21 PROCEDURE — 97110 THERAPEUTIC EXERCISES: CPT

## 2022-02-21 PROCEDURE — 97140 MANUAL THERAPY 1/> REGIONS: CPT

## 2022-02-21 PROCEDURE — 97112 NEUROMUSCULAR REEDUCATION: CPT

## 2022-02-21 NOTE — PROGRESS NOTES
Daily Note     Today's date: 2022  Patient name: Karen Clements  : 1955  MRN: [id number]  Referring provider: Chantal Nava MD  Dx:   Encounter Diagnosis     ICD-10-CM    1  Chronic bilateral low back pain without sciatica  M54 50     G89 29    2  Neck pain  M54 2    3  Myofascial pain  M79 18    4  Atypical facial pain  G50 1        Start Time: 1030  Stop Time: 1115  Total time in clinic (min): 45 minutes    Subjective: Patient reports sleeping for longer period of time last night (about 9 hours) and feels she woke up more stiff from being immobile all night  Pt presents to PT with more pain in thoracic and cervical region although is still having less pain overall          Objective: See treatment diary below    Precautions: hx anxiety, osteoporosis        Manuals    IASTM L UT 10 min 10 min 10 min 10 min naomi 10 min naomi 10 min ea 10 min naomi 10 min naomi    10 min   measurements/RE                  35 min      manual traction 5 min 5 min 5 min            5 min  NV    p-a thoracic spine 5 min 5 min 5 min            NV  5 min   Neuro Re-Ed                       Abdominal bracing    np 10" x10 10" x10 10" x10 10" x10       Scapular retraction    10" 10x nv 10" x10 10" x10 10" x10       Cervical retraction    np 5"x10 5"x10 5"x10 5"x15       Row with band Green 5"x15  Green 5"x20 Green 5"x20 Red 5" 15x Red 5"x15 Red 5"x20 Red 5"x20 Green 5"x15    green 5" 15x   Deep neck flexion supine 5"x10 5"x10 5"x10 5" 10x 5"x10 5"x10 5"x10 5"x10       Prone T, W 5"x10 T 5"x10 ea 5"x10 ea 5" 5x each 5"x5 ea 5"x7 ea 5"x7 ea 5"x10 ea    5" 10x T   SLS 20"x3 ea 30"x3 ea 20"x3 ea foam np np 15"x3 ea 20"x3 ea 20"x3 ea       Ther Ex                       UBE for/rev 5 min total 5 min total 5 min total 5 min total 5 min total 5 min total 5 min total 5 min total  5 min total  5 min total   SLR     5" 5x naomi 5"x10 ea 5"x10 ea np np       sidelying hip abduction     5" 5x naomi 5"x10 ea 5"x10 ea np np       Prone hip extension     5" 5x naomi 5"x10 ea 5"x10 ea np np       bridge     5" 5x 5"x10 5"x15 5"x15 5"x15       L UT stretch 15"x3 ea 15"x3 ea 15"x3 ea 3x15" naomi 15"x3 ea 15"x3 ea 15"x3 ea 15"x3 ea    15" 3x    patient edu                   10 min                           Ther Activity                                                                       Gait Training                                                                       Modalities                       PRN                                               Access Code [email or web address]        Assessment: Tolerated treatment well  Good tolerance to IASTM to bilat UT and manual cervical traction  Pt cont to present with mod restrictions through bilat UT region  Mild cueing for technique with deep neck flexion  Progressed SLS to foam with greater challenge demonstrated  Pt cont to have positive response to thoracic mobs performed by Jr Harrison DPT  Patient exhibited good technique with therapeutic exercises and would benefit from continued PT  Plan: Progress treatment as tolerated  Pt was treated via unsupervised time from 11:10 - 11:15 am and was 1:1 with treating clinician for rest of session

## 2022-02-25 ENCOUNTER — OFFICE VISIT (OUTPATIENT)
Dept: PHYSICAL THERAPY | Facility: REHABILITATION | Age: 67
End: 2022-02-25
Payer: MEDICARE

## 2022-02-25 DIAGNOSIS — M54.50 CHRONIC BILATERAL LOW BACK PAIN WITHOUT SCIATICA: Primary | ICD-10-CM

## 2022-02-25 DIAGNOSIS — G50.1 ATYPICAL FACIAL PAIN: ICD-10-CM

## 2022-02-25 DIAGNOSIS — M79.18 MYOFASCIAL PAIN: ICD-10-CM

## 2022-02-25 DIAGNOSIS — M54.2 NECK PAIN: ICD-10-CM

## 2022-02-25 DIAGNOSIS — G89.29 CHRONIC BILATERAL LOW BACK PAIN WITHOUT SCIATICA: Primary | ICD-10-CM

## 2022-02-25 PROCEDURE — 97112 NEUROMUSCULAR REEDUCATION: CPT | Performed by: PHYSICAL THERAPIST

## 2022-02-25 PROCEDURE — 97110 THERAPEUTIC EXERCISES: CPT | Performed by: PHYSICAL THERAPIST

## 2022-02-25 PROCEDURE — 97140 MANUAL THERAPY 1/> REGIONS: CPT | Performed by: PHYSICAL THERAPIST

## 2022-02-25 NOTE — PROGRESS NOTES
Daily Note     Today's date: 2022  Patient name: Joslyn Lucio  : 1955  MRN: [id number]  Referring provider: Jennifer Day MD  Dx:   Encounter Diagnosis     ICD-10-CM    1  Chronic bilateral low back pain without sciatica  M54 50     G89 29    2  Neck pain  M54 2    3  Myofascial pain  M79 18    4  Atypical facial pain  G50 1        Start Time: 1118  Stop Time: 1203  Total time in clinic (min): 45 minutes    Subjective: Patient reports continued lumbar discomfort when waking up in the morning  She reports no significant changes overall since last visit, but she did have an ultrasound on her neck yesterday  She is unsure of the results          Objective: See treatment diary below    Precautions: hx anxiety, osteoporosis        Manuals    IASTM L UT 10 min 10 min 10 min 10 min naomi 10 min naomi 10 min ea 10 min naomi 10 min naomi    10 min   measurements/RE                  35 min      manual traction 5 min 5 min 5 min  5 min          5 min  NV    p-a thoracic spine 5 min 5 min 5 min  5 min          NV  5 min   Neuro Re-Ed                       Abdominal bracing     10" x10 10" x10 10" x10 10" x10       Scapular retraction     nv 10" x10 10" x10 10" x10       Cervical retraction     5"x10 5"x10 5"x10 5"x15       Row with band Green 5"x15  Green 5"x20 Green 5"x20 Green 5" 20x Red 5"x15 Red 5"x20 Red 5"x20 Green 5"x15    green 5" 15x   Deep neck flexion supine 5"x10 5"x10 5"x10 5" 10x 5"x10 5"x10 5"x10 5"x10       Prone T, W 5"x10 T 5"x10 ea 5"x10 ea  5"x5 ea 5"x7 ea 5"x7 ea 5"x10 ea    5" 10x T   SLS 20"x3 ea 30"x3 ea 20"x3 ea foam  np 15"x3 ea 20"x3 ea 20"x3 ea       Ther Ex                       UBE for/rev 5 min total 5 min total 5 min total 5 min total 5 min total 5 min total 5 min total 5 min total  5 min total  5 min total   SLR      5"x10 ea 5"x10 ea np np       sidelying hip abduction      5"x10 ea 5"x10 ea np np       Prone hip extension      5"x10 ea 5"x10 ea np np       bridge      5"x10 5"x15 5"x15 5"x15       L UT stretch 15"x3 ea 15"x3 ea 15"x3 ea 3x15" each 15"x3 ea 15"x3 ea 15"x3 ea 15"x3 ea    15" 3x    patient edu                   10 min                           Ther Activity                                                                       Gait Training                                                                       Modalities                       PRN                                               Access Code [email or web address]        Assessment: Tolerated treatment well  Patient exhibited good technique with therapeutic exercises and would benefit from continued PT  Issued green band for rows with HEP  Also added LTR to be perfomed with HEP prior to getting out of bed each morning  Assess response NV  Continue to progress as able  Plan: Progress treatment as tolerated

## 2022-02-28 ENCOUNTER — OFFICE VISIT (OUTPATIENT)
Dept: PHYSICAL THERAPY | Facility: REHABILITATION | Age: 67
End: 2022-02-28
Payer: MEDICARE

## 2022-02-28 DIAGNOSIS — M79.18 MYOFASCIAL PAIN: ICD-10-CM

## 2022-02-28 DIAGNOSIS — G89.29 CHRONIC BILATERAL LOW BACK PAIN WITHOUT SCIATICA: Primary | ICD-10-CM

## 2022-02-28 DIAGNOSIS — M54.50 CHRONIC BILATERAL LOW BACK PAIN WITHOUT SCIATICA: Primary | ICD-10-CM

## 2022-02-28 DIAGNOSIS — M54.2 NECK PAIN: ICD-10-CM

## 2022-02-28 PROCEDURE — 97140 MANUAL THERAPY 1/> REGIONS: CPT

## 2022-02-28 PROCEDURE — 97110 THERAPEUTIC EXERCISES: CPT

## 2022-02-28 PROCEDURE — 97112 NEUROMUSCULAR REEDUCATION: CPT

## 2022-02-28 NOTE — PROGRESS NOTES
Daily Note     Today's date: 2022  Patient name: Pily Staton  : 1955  MRN: [id number]  Referring provider: Feroz Masters MD  Dx:   Encounter Diagnosis     ICD-10-CM    1  Chronic bilateral low back pain without sciatica  M54 50     G89 29    2  Neck pain  M54 2    3  Myofascial pain  M79 18        Start Time: 1115  Stop Time: 1200  Total time in clinic (min): 45 minutes    Subjective: Patient reports experiencing more soreness in bilat UT and R scapular region over the last few days and might even be since last visit  Pt describes as muscular and feels R sided pain may be from using her computer and mouse this past weekend          Objective: See treatment diary below    Precautions: hx anxiety, osteoporosis        Manuals    IASTM L UT 10 min 10 min 10 min 10 min naomi 10 imn 10 min ea 10 min naomi 10 min naomi    10 min   measurements/RE                  35 min      manual traction 5 min 5 min 5 min  5 min 5 min        5 min  NV    p-a thoracic spine 5 min 5 min 5 min  5 min  STM  5 min        NV  5 min   Neuro Re-Ed                       Abdominal bracing      10" x10 10" x10 10" x10       Scapular retraction      10" x10 10" x10 10" x10       Cervical retraction      5"x10 5"x10 5"x15       Row with band Green 5"x15  Green 5"x20 Green 5"x20 Green 5" 20x Blue 5"x15 Red 5"x20 Red 5"x20 Green 5"x15    green 5" 15x   Deep neck flexion supine 5"x10 5"x10 5"x10 5" 10x 5"x10 5"x10 5"x10 5"x10       Prone T, W 5"x10 T 5"x10 ea 5"x10 ea  5"x10 5"x7 ea 5"x7 ea 5"x10 ea    5" 10x T   SLS 20"x3 ea 30"x3 ea 20"x3 ea foam  20"x3 ea foam 15"x3 ea 20"x3 ea 20"x3 ea       Ther Ex                       UBE for/rev 5 min total 5 min total 5 min total 5 min total 5 min total 5 min total 5 min total 5 min total  5 min total  5 min total   SLR       5"x10 ea np np       sidelying hip abduction       5"x10 ea np np       Prone hip extension       5"x10 ea np np       bridge       5"x15 5"x15 5"x15       L UT stretch 15"x3 ea 15"x3 ea 15"x3 ea 3x15" each 15"x3 ea 15"x3 ea 15"x3 ea 15"x3 ea    15" 3x    patient edu                   10 min                           Ther Activity                                                                       Gait Training                                                                       Modalities                       PRN                                               Access Code [email or web address]        Assessment: Tolerated treatment well  Mod soft tissue restrictions felt through instruments during IASTM to bilat UT  Held on mobs this visit secondary to soreness, but performed mild STM to R scapular region with positive response  Discussed modifications for computer work station to improve posture and reduce pain levels  Assess response NV  Patient exhibited good technique with therapeutic exercises and would benefit from continued PT  Plan: Progress treatment as tolerated

## 2022-03-04 ENCOUNTER — OFFICE VISIT (OUTPATIENT)
Dept: PHYSICAL THERAPY | Facility: REHABILITATION | Age: 67
End: 2022-03-04
Payer: MEDICARE

## 2022-03-04 DIAGNOSIS — M54.2 NECK PAIN: ICD-10-CM

## 2022-03-04 DIAGNOSIS — M54.50 CHRONIC BILATERAL LOW BACK PAIN WITHOUT SCIATICA: Primary | ICD-10-CM

## 2022-03-04 DIAGNOSIS — G89.29 CHRONIC BILATERAL LOW BACK PAIN WITHOUT SCIATICA: Primary | ICD-10-CM

## 2022-03-04 DIAGNOSIS — G50.1 ATYPICAL FACIAL PAIN: ICD-10-CM

## 2022-03-04 DIAGNOSIS — M79.18 MYOFASCIAL PAIN: ICD-10-CM

## 2022-03-04 PROCEDURE — 97110 THERAPEUTIC EXERCISES: CPT

## 2022-03-04 PROCEDURE — 97112 NEUROMUSCULAR REEDUCATION: CPT

## 2022-03-04 PROCEDURE — 97140 MANUAL THERAPY 1/> REGIONS: CPT

## 2022-03-04 NOTE — PROGRESS NOTES
Daily Note     Today's date: 3/4/2022  Patient name: Luis Varela  : 1955  MRN: [id number]  Referring provider: Deepthi Duffy MD  Dx:   Encounter Diagnosis     ICD-10-CM    1  Chronic bilateral low back pain without sciatica  M54 50     G89 29    2  Neck pain  M54 2    3  Myofascial pain  M79 18    4  Atypical facial pain  G50 1        Start Time: 1115  Stop Time: 1200  Total time in clinic (min): 45 minutes    Subjective: Patient reports pain in R scapular region that happens with reaching behind and performing scapular retraction type motions, also notes pain in bilat UT at times  Pt describes symptoms as being "bad muscle pain" vs good muscle pain  Pt reports severity of symptoms is improving however and is having less frequently            Objective: See treatment diary below    Precautions: hx anxiety, osteoporosis        Manuals 2/14 2/18 2/21 2/25 2/28 3/4 1/31 2/4  2/7 2/11   IASTM L UT 10 min 10 min 10 min 10 min naomi 10 imn 10 min 10 min naomi 10 min naomi    10 min   measurements/RE                  35 min      manual traction 5 min 5 min 5 min  5 min 5 min 5 min      5 min  NV    p-a thoracic spine 5 min 5 min 5 min  5 min  STM  5 min 5 min      NV  5 min   Neuro Re-Ed                       Abdominal bracing       10" x10 10" x10       Scapular retraction       10" x10 10" x10       Cervical retraction       5"x10 5"x15       Row with band Green 5"x15  Green 5"x20 Green 5"x20 Green 5" 20x Blue 5"x15 Blue 5"x20 Red 5"x20 Green 5"x15    green 5" 15x   Deep neck flexion supine 5"x10 5"x10 5"x10 5" 10x 5"x10 5"x10 5"x10 5"x10       Prone T, W 5"x10 T 5"x10 ea 5"x10 ea  5"x10 5"x10 ea 5"x7 ea 5"x10 ea    5" 10x T   SLS 20"x3 ea 30"x3 ea 20"x3 ea foam  20"x3 ea foam 20"x3 ea foam 20"x3 ea 20"x3 ea       Ther Ex                       UBE for/rev 5 min total 5 min total 5 min total 5 min total 5 min total 5 min total 5 min total 5 min total  5 min total  5 min total   SLR        np np       sidelying hip abduction        np np       Prone hip extension        np np       bridge        5"x15 5"x15       L UT stretch 15"x3 ea 15"x3 ea 15"x3 ea 3x15" each 15"x3 ea 15"x3 ea 15"x3 ea 15"x3 ea    15" 3x    patient edu                   10 min                           Ther Activity                                                                       Gait Training                                                                       Modalities                       PRN                                               Access Code [email or web address]        Assessment: Tolerated treatment well  Pt presents with greater soft tissue restrictions in R UT vs L with IASTM, however restrictions are present bilaterally  Cueing provided to facilitate proper technique with deep neck flex, demo's UT compensation at times with prone T/W  Good tolerance to mobs performed by Shilpa Mchugh DPT  Patient exhibited good technique with therapeutic exercises and would benefit from continued PT  Plan: Progress treatment as tolerated

## 2022-03-07 ENCOUNTER — OFFICE VISIT (OUTPATIENT)
Dept: PHYSICAL THERAPY | Facility: REHABILITATION | Age: 67
End: 2022-03-07
Payer: MEDICARE

## 2022-03-07 DIAGNOSIS — G50.1 ATYPICAL FACIAL PAIN: ICD-10-CM

## 2022-03-07 DIAGNOSIS — M79.18 MYOFASCIAL PAIN: ICD-10-CM

## 2022-03-07 DIAGNOSIS — M54.50 CHRONIC BILATERAL LOW BACK PAIN WITHOUT SCIATICA: Primary | ICD-10-CM

## 2022-03-07 DIAGNOSIS — G89.29 CHRONIC BILATERAL LOW BACK PAIN WITHOUT SCIATICA: Primary | ICD-10-CM

## 2022-03-07 DIAGNOSIS — M54.2 NECK PAIN: ICD-10-CM

## 2022-03-07 PROCEDURE — 97110 THERAPEUTIC EXERCISES: CPT | Performed by: PHYSICAL THERAPIST

## 2022-03-07 PROCEDURE — 97140 MANUAL THERAPY 1/> REGIONS: CPT | Performed by: PHYSICAL THERAPIST

## 2022-03-07 PROCEDURE — 97112 NEUROMUSCULAR REEDUCATION: CPT | Performed by: PHYSICAL THERAPIST

## 2022-03-07 NOTE — PROGRESS NOTES
Daily Note     Today's date: 3/7/2022  Patient name: Pily Staton  : 1955  MRN: [id number]  Referring provider: Feroz Masters MD  Dx:   Encounter Diagnosis     ICD-10-CM    1  Chronic bilateral low back pain without sciatica  M54 50     G89 29    2  Neck pain  M54 2    3  Myofascial pain  M79 18    4  Atypical facial pain  G50 1        Start Time: 1115  Stop Time: 1200  Total time in clinic (min): 45 minutes    Subjective: Patient reports less discomfort this morning compared to last treatment session  Minimal overall pain naomi UT  Continues to have similar facial pain  Continued pain first thing in the morning in the lumbar spine             Objective: See treatment diary below    Precautions: hx anxiety, osteoporosis        Manuals 2/14 2/18 2/21 2/25 2/28 3/4 3/7 2/4  2/7 2/11   IASTM L UT 10 min 10 min 10 min 10 min naomi 10 imn 10 min 10 min naomi 10 min naomi    10 min   measurements/RE                  35 min      manual traction 5 min 5 min 5 min  5 min 5 min 5 min 5 min    5 min  NV    p-a thoracic spine 5 min 5 min 5 min  5 min  STM  5 min 5 min 5 min    NV  5 min   Neuro Re-Ed                       Abdominal bracing        10" x10       Scapular retraction        10" x10       Cervical retraction        5"x15       Row with band Green 5"x15  Green 5"x20 Green 5"x20 Green 5" 20x Blue 5"x15 Blue 5"x20 Blue 5" 20x Green 5"x15    green 5" 15x   Deep neck flexion supine 5"x10 5"x10 5"x10 5" 10x 5"x10 5"x10 5" 10x 5"x10       Prone T, W 5"x10 T 5"x10 ea 5"x10 ea  5"x10 5"x10 ea 5" 10x each 5"x10 ea    5" 10x T   SLS 20"x3 ea 30"x3 ea 20"x3 ea foam  20"x3 ea foam 20"x3 ea foam 3x20" naomi 20"x3 ea       Ther Ex                       UBE for/rev 5 min total 5 min total 5 min total 5 min total 5 min total 5 min total 5 min total 5 min total  5 min total  5 min total   SLR         np       sidelying hip abduction         np       Prone hip extension         np       bridge         5"x15       L UT stretch 15"x3 ea 15"x3 ea 15"x3 ea 3x15" each 15"x3 ea 15"x3 ea 15"x3 ea 15"x3 ea    15" 3x    patient edu                   10 min                           Ther Activity                                                                       Gait Training                                                                       Modalities                       PRN                                               Access Code [email or web address]        Assessment: Tolerated treatment well  Due for RE next visit  Probable discharged to [facility] at that time  Patient exhibited good technique with therapeutic exercises and would benefit from continued PT  Review [facility] NV and issue as needed  Plan: Potential discharge next visit  Progress treatment as tolerated

## 2022-03-11 ENCOUNTER — EVALUATION (OUTPATIENT)
Dept: PHYSICAL THERAPY | Facility: REHABILITATION | Age: 67
End: 2022-03-11
Payer: MEDICARE

## 2022-03-11 DIAGNOSIS — M54.2 NECK PAIN: ICD-10-CM

## 2022-03-11 DIAGNOSIS — G50.1 ATYPICAL FACIAL PAIN: ICD-10-CM

## 2022-03-11 DIAGNOSIS — M79.18 MYOFASCIAL PAIN: ICD-10-CM

## 2022-03-11 DIAGNOSIS — G89.29 CHRONIC BILATERAL LOW BACK PAIN WITHOUT SCIATICA: Primary | ICD-10-CM

## 2022-03-11 DIAGNOSIS — M54.50 CHRONIC BILATERAL LOW BACK PAIN WITHOUT SCIATICA: Primary | ICD-10-CM

## 2022-03-11 PROCEDURE — 97110 THERAPEUTIC EXERCISES: CPT | Performed by: PHYSICAL THERAPIST

## 2022-03-11 PROCEDURE — 97140 MANUAL THERAPY 1/> REGIONS: CPT | Performed by: PHYSICAL THERAPIST

## 2022-03-11 NOTE — PROGRESS NOTES
PT Discharge    Today's date: 3/11/2022  Patient name: Jorge A Johnson  : 1955  MRN: [id number]  Referring provider: Geoffrey Roque MD  Dx:   Encounter Diagnosis     ICD-10-CM    1  Chronic bilateral low back pain without sciatica  M54 50     G89 29    2  Neck pain  M54 2    3  Myofascial pain  M79 18    4  Atypical facial pain  G50 1        Start Time: 1120  Stop Time: 1210  Total time in clinic (min): 50 minutes    Assessment  Assessment details: Patient is a 74 y o  female that presents with lumbar spine and cervical/facial pain  Patient reports improvement with skilled physical therapy services overall with her lumbar spine and cervical spine pain  She has not noticed any overall improvement with facial pain to date  Patient's FOTO improved by 8 points to 71/100 for lumbar spine and 3 points to 63/100 for cervical spine  Patient reports improvement with low back pain and mobility in the morning  She feels like she can now twist a little bit with waking up in the morning  She feels she is improving overall with her strength  She can now read for a longer duration and walk without shoulder/cervical spine pain  Patient reports continued difficulty with facial pain, headaches, heaviness in her arms, and difficulty moving in the morning  Patient has made good progress towards goals established for physical therapy  Patient would benefit from HEP for continued strengthening and postural education to maximize function  Impairments: abnormal or restricted ROM, activity intolerance, impaired balance, impaired physical strength and pain with function  Understanding of Dx/Px/POC: good   Prognosis: fair  Prognosis details: Chronic pain    Goals  Impairment:  1  Patient will reports 50% reduction in pain in 4 weeks to maximize function  -PARTIALLY MET  2  Patient will improve strength to 4/5 in all planes to maximize function  -PARTIALLY MET  3    Patient will improve ROM to [facility] in 4 weeks to maximize function  -PARTIALLY MET    Functional:  1  Patient will improve FOTO by 4 points to 67/100 in 4 weeks to maximize function for lumbar spine  -MET  2  Patient will be independent with HEP in 4 weeks to maximize function  -MET  3  Patient will report no difficulty with ambulation in 4 weeks to maximize function  -PARTIALLY MET  4  Patient will report no difficulty with looking upward in 4 weeks to maximize function  -PARTIALLY MET  5  Patient will report no difficulty with lifting in 4 weeks to maximize function  -NOT ATTEMPTED  6  Patient will improve FOTO by 3 points to 63/100 in 4 weeks to maximize function for cervical spine  -MET      Plan  Plan details: Patient will be discharged to [facility] at this time  Planned modality interventions: cryotherapy, thermotherapy: hydrocollator packs, unattended electrical stimulation and TENS  Planned therapy interventions: abdominal trunk stabilization, patient education, neuromuscular re-education, home exercise program, therapeutic exercise, therapeutic activities, stretching, strengthening, postural training, manual therapy, functional ROM exercises and balance  Treatment plan discussed with: patient        Subjective Evaluation    History of Present Illness  Mechanism of injury: Patient presents with low back and cervical/facial pain  Patient reports her low back pain has been present for several years, but has been worse over the last year  Her pain is worst in the morning waking up for the first 30-60 minutes  She feels she is flexed forward and would have pain trying to stand up straight  Once she is able to stand up straight her pain is minimal the remainder of the day  She feels she is tilted to the left in the morning  Historically, she feels this is worst when she is inactive and that weak muscles contribute  Patient reports her neck pain has been present for 20-30 years, but she has been having facial pain for the last 2 years    Patient has been taking Nortriptyline for her facial pain with relief  She reports her facial pain can be random  She feels it is nerve related  Patient has no pain with chewing  She will get pain when her head is being pushed forward as if by a couch  Patient reports her cervical spine pain is constant, but is worst with walking into her neck and shoulders as if someone is pulling  She will move her arms overhead and that seems to help  She feels something may be "stuck"  Patient has difficulty with looking upward, lifting, 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, and with ambulation  Pain  At best pain ratin (cervical spine: 1/10)  At worst pain rating: 3 (cervical spine: 9/10 (tight))  Location: lumbar spine  Quality: tight and pulling  Aggravating factors: walking  Progression: improved    Hand dominance: right      Diagnostic Tests  No diagnostic tests performed  Treatments  Current treatment: physical therapy  Patient Goals  Patient goals for therapy: decreased pain          Objective     Static Posture     Head  Forward  Shoulders  Rounded  Palpation   Left   No palpable tenderness to the levator scapulae, lumbar paraspinals, middle trapezius, rhomboids, suboccipitals and upper trapezius  Right   No palpable tenderness to the levator scapulae, lumbar paraspinals, middle trapezius, rhomboids and suboccipitals  Tenderness of the upper trapezius  Tenderness   Cervical Spine   No tenderness in the spinous process  Left Shoulder   No tenderness     Right Shoulder  No tenderness     Lumbar Spine  No tenderness in the spinous process  Left Hip   No tenderness in the PSIS  Right Hip   No tenderness in the PSIS       Neurological Testing     Reflexes   Left   Patellar (L4): brisk (3+)  Achilles (S1): normal (2+)    Right   Patellar (L4): brisk (3+)  Achilles (S1): normal (2+)    Active Range of Motion   Cervical/Thoracic Spine       Cervical    Flexion: 50 degrees   Extension: 60 degrees     with pain  Left lateral flexion: 32 degrees      Right lateral flexion: 35 degrees      Left rotation: 58 degrees  Right rotation: 50 degrees    with pain  Left Shoulder   Flexion: WFL  Abduction: WFL    Right Shoulder   Flexion: WFL  Abduction: WFL    Lumbar   Flexion:  WFL  Extension:  WFL and with pain  Left lateral flexion:  WFL  Right lateral flexion:  WFL  Left rotation:  WFL  Right rotation:  WFL    Passive Range of Motion   Cervical/Thoracic Spine   Normal passive range of motion    Joint Play   Joints within functional limits: C1, C2, C3, C4, C5, C6, C7, T1, T2, L1, L2, L3, L4 and L5     Hypomobile: T3, T4, T5, T6, T7 and T8     Strength/Myotome Testing     Left Shoulder     Planes of Motion   Flexion: 4+   Abduction: 4     Right Shoulder     Planes of Motion   Flexion: 4+   Abduction: 4     Left Elbow   Flexion: 4  Extension: 4    Right Elbow   Flexion: 4+  Extension: 4+    Left Wrist/Hand      (2nd hand position)     Trial 1: 38    Right Wrist/Hand      (2nd hand position)     Trial 1: 40    Left Hip   Planes of Motion   Flexion: 4+  Extension: 4  Abduction: 4  External rotation: 4  Internal rotation: 4+    Right Hip   Planes of Motion   Flexion: 4+  Extension: 4-  Abduction: 4  External rotation: 4  Internal rotation: 4+    Left Knee   Flexion: 4  Extension: 4    Right Knee   Flexion: 4  Extension: 4    Left Ankle/Foot   Dorsiflexion: 4  Inversion: 4+  Eversion: 4+    Right Ankle/Foot   Dorsiflexion: 4+  Inversion: 4  Eversion: 4+    Tests     Lumbar     Left   Negative crossed SLR and passive SLR  Right   Negative crossed SLR and passive SLR  Left Pelvic Girdle/Sacrum   Negative: thigh thrust      Right Pelvic Girdle/Sacrum   Negative: thigh thrust      Left Hip   Negative SOLIS and FADIR  Right Hip   Negative FADIR  Additional Tests Details  (-) Holland sign    Ambulation     Observational Gait   Decreased walking speed and stride length       Functional Assessment        Single Leg Stance   Left: 30 seconds  Right: 30 seconds             Precautions: hx anxiety, osteoporosis        Manuals 2/14 2/18 2/21 2/25 2/28 3/4 3/7 3/11  2/7 2/11   IASTM L UT 10 min 10 min 10 min 10 min naomi 10 imn 10 min 10 min naomi 10 min naomi    10 min   measurements/RE               10 min  35 min      manual traction 5 min 5 min 5 min  5 min 5 min 5 min 5 min 5 min  5 min  NV    p-a thoracic spine 5 min 5 min 5 min  5 min  STM  5 min 5 min 5 min 5 min  NV  5 min   Neuro Re-Ed                       Abdominal bracing                      Scapular retraction               10" x10       Cervical retraction               5"x15       Row with band Green 5"x15  Green 5"x20 Green 5"x20 Green 5" 20x Blue 5"x15 Blue 5"x20 Blue 5" 20x Blue 5" 20x    green 5" 15x   Deep neck flexion supine 5"x10 5"x10 5"x10 5" 10x 5"x10 5"x10 5" 10x 5"x10       Prone T, W 5"x10 T 5"x10 ea 5"x10 ea   5"x10 5"x10 ea 5" 10x each     5" 10x T   SLS 20"x3 ea 30"x3 ea 20"x3 ea foam   20"x3 ea foam 20"x3 ea foam 3x20" naomi        Ther Ex                       UBE for/rev 5 min total 5 min total 5 min total 5 min total 5 min total 5 min total 5 min total 5 min total  5 min total  5 min total   SLR                      sidelying hip abduction                      Prone hip extension                      bridge                      L UT stretch 15"x3 ea 15"x3 ea 15"x3 ea 3x15" each 15"x3 ea 15"x3 ea 15"x3 ea 15"x3 ea    15" 3x    patient edu                    10 min                           Ther Activity                                                                       Gait Training                                                                       Modalities                       PRN                                               Access Code [email or web address]